# Patient Record
Sex: FEMALE | Race: WHITE | URBAN - METROPOLITAN AREA
[De-identification: names, ages, dates, MRNs, and addresses within clinical notes are randomized per-mention and may not be internally consistent; named-entity substitution may affect disease eponyms.]

---

## 2017-09-28 ENCOUNTER — OFFICE VISIT (OUTPATIENT)
Dept: FAMILY MEDICINE CLINIC | Age: 22
End: 2017-09-28

## 2017-09-28 VITALS
OXYGEN SATURATION: 98 % | TEMPERATURE: 98 F | WEIGHT: 106.2 LBS | BODY MASS INDEX: 18.13 KG/M2 | DIASTOLIC BLOOD PRESSURE: 60 MMHG | HEIGHT: 64 IN | SYSTOLIC BLOOD PRESSURE: 100 MMHG | HEART RATE: 121 BPM

## 2017-09-28 DIAGNOSIS — R10.84 GENERALIZED ABDOMINAL PAIN: ICD-10-CM

## 2017-09-28 DIAGNOSIS — N93.9 ABNORMAL VAGINAL BLEEDING: ICD-10-CM

## 2017-09-28 DIAGNOSIS — E55.9 VITAMIN D DEFICIENCY: ICD-10-CM

## 2017-09-28 DIAGNOSIS — Z77.011 LEAD EXPOSURE: ICD-10-CM

## 2017-09-28 DIAGNOSIS — G44.52 NEW DAILY PERSISTENT HEADACHE: ICD-10-CM

## 2017-09-28 DIAGNOSIS — R21 RASH: ICD-10-CM

## 2017-09-28 DIAGNOSIS — E06.3 HASHIMOTO'S DISEASE: ICD-10-CM

## 2017-09-28 DIAGNOSIS — R10.84 GENERALIZED ABDOMINAL PAIN: Primary | ICD-10-CM

## 2017-09-28 LAB
A/G RATIO: 1.9 (ref 1.1–2.2)
ALBUMIN SERPL-MCNC: 4.9 G/DL (ref 3.4–5)
ALP BLD-CCNC: 56 U/L (ref 40–129)
ALT SERPL-CCNC: 14 U/L (ref 10–40)
ANION GAP SERPL CALCULATED.3IONS-SCNC: 17 MMOL/L (ref 3–16)
AST SERPL-CCNC: 26 U/L (ref 15–37)
BASOPHILS ABSOLUTE: 0 K/UL (ref 0–0.2)
BASOPHILS RELATIVE PERCENT: 0.5 %
BILIRUB SERPL-MCNC: 1.4 MG/DL (ref 0–1)
BILIRUBIN, POC: NORMAL
BLOOD URINE, POC: NORMAL
BUN BLDV-MCNC: 6 MG/DL (ref 7–20)
CALCIUM SERPL-MCNC: 9.5 MG/DL (ref 8.3–10.6)
CHLORIDE BLD-SCNC: 100 MMOL/L (ref 99–110)
CLARITY, POC: NORMAL
CO2: 25 MMOL/L (ref 21–32)
COLOR, POC: NORMAL
CREAT SERPL-MCNC: 0.7 MG/DL (ref 0.6–1.1)
EOSINOPHILS ABSOLUTE: 0.1 K/UL (ref 0–0.6)
EOSINOPHILS RELATIVE PERCENT: 3.8 %
FOLATE: 8.14 NG/ML (ref 4.78–24.2)
GFR AFRICAN AMERICAN: >60
GFR NON-AFRICAN AMERICAN: >60
GLOBULIN: 2.6 G/DL
GLUCOSE BLD-MCNC: 82 MG/DL (ref 70–99)
GLUCOSE URINE, POC: NORMAL
HCT VFR BLD CALC: 42.7 % (ref 36–48)
HEMOGLOBIN: 14.6 G/DL (ref 12–16)
KETONES, POC: NORMAL
LEUKOCYTE EST, POC: NORMAL
LYMPHOCYTES ABSOLUTE: 1.4 K/UL (ref 1–5.1)
LYMPHOCYTES RELATIVE PERCENT: 36.3 %
MCH RBC QN AUTO: 30.7 PG (ref 26–34)
MCHC RBC AUTO-ENTMCNC: 34.3 G/DL (ref 31–36)
MCV RBC AUTO: 89.5 FL (ref 80–100)
MONOCYTES ABSOLUTE: 0.3 K/UL (ref 0–1.3)
MONOCYTES RELATIVE PERCENT: 8.7 %
NEUTROPHILS ABSOLUTE: 2 K/UL (ref 1.7–7.7)
NEUTROPHILS RELATIVE PERCENT: 50.7 %
NITRITE, POC: NORMAL
PDW BLD-RTO: 12.8 % (ref 12.4–15.4)
PH, POC: 5
PLATELET # BLD: 141 K/UL (ref 135–450)
PMV BLD AUTO: 9.1 FL (ref 5–10.5)
POTASSIUM SERPL-SCNC: 4.2 MMOL/L (ref 3.5–5.1)
PROTEIN, POC: NORMAL
RBC # BLD: 4.77 M/UL (ref 4–5.2)
SODIUM BLD-SCNC: 142 MMOL/L (ref 136–145)
SPECIFIC GRAVITY, POC: 1.01
T4 FREE: 1.1 NG/DL (ref 0.9–1.8)
THYROID PEROXIDASE (TPO) ABS: 97 IU/ML
TOTAL PROTEIN: 7.5 G/DL (ref 6.4–8.2)
TSH SERPL DL<=0.05 MIU/L-ACNC: 4.07 UIU/ML (ref 0.27–4.2)
UROBILINOGEN, POC: 0.2
VITAMIN B-12: 478 PG/ML (ref 211–911)
VITAMIN D 25-HYDROXY: 47.6 NG/ML
WBC # BLD: 3.9 K/UL (ref 4–11)

## 2017-09-28 PROCEDURE — 99214 OFFICE O/P EST MOD 30 MIN: CPT | Performed by: NURSE PRACTITIONER

## 2017-09-28 PROCEDURE — 81002 URINALYSIS NONAUTO W/O SCOPE: CPT | Performed by: NURSE PRACTITIONER

## 2017-09-28 RX ORDER — NORETHINDRONE ACETATE AND ETHINYL ESTRADIOL .03; 1.5 MG/1; MG/1
1 TABLET ORAL DAILY
COMMUNITY
End: 2020-04-22 | Stop reason: SDUPTHER

## 2017-09-28 ASSESSMENT — ENCOUNTER SYMPTOMS
DIARRHEA: 0
EYE WATERING: 0
ALLERGIC/IMMUNOLOGIC NEGATIVE: 1
VISUAL CHANGE: 0
EYE PAIN: 0
BLURRED VISION: 0
NAIL CHANGES: 0
COUGH: 0
SORE THROAT: 0
HEMATOCHEZIA: 0
SCALP TENDERNESS: 0
NAUSEA: 0
SHORTNESS OF BREATH: 0
BACK PAIN: 1
BELCHING: 0
RHINORRHEA: 0
VOMITING: 0
EYE REDNESS: 0
EYES NEGATIVE: 1
PHOTOPHOBIA: 0
ABDOMINAL PAIN: 1
SWOLLEN GLANDS: 0
RESPIRATORY NEGATIVE: 1
FACIAL SWEATING: 0
SINUS PRESSURE: 0
CONSTIPATION: 0

## 2017-09-29 LAB
ANA INTERPRETATION: NORMAL
ANTI-NUCLEAR ANTIBODY (ANA): NEGATIVE
SEDIMENTATION RATE, ERYTHROCYTE: 10 MM/HR (ref 0–20)

## 2017-09-30 LAB
LEAD LEVEL BLOOD: <2 UG/DL (ref 0–4.9)
URINE CULTURE, ROUTINE: NORMAL

## 2020-03-10 ENCOUNTER — OFFICE VISIT (OUTPATIENT)
Dept: FAMILY MEDICINE CLINIC | Age: 25
End: 2020-03-10
Payer: COMMERCIAL

## 2020-03-10 VITALS
RESPIRATION RATE: 16 BRPM | HEART RATE: 73 BPM | SYSTOLIC BLOOD PRESSURE: 119 MMHG | BODY MASS INDEX: 21.07 KG/M2 | WEIGHT: 123.4 LBS | DIASTOLIC BLOOD PRESSURE: 87 MMHG | TEMPERATURE: 96.7 F | HEIGHT: 64 IN | OXYGEN SATURATION: 99 %

## 2020-03-10 PROCEDURE — 99385 PREV VISIT NEW AGE 18-39: CPT | Performed by: FAMILY MEDICINE

## 2020-03-10 RX ORDER — DESOGESTREL AND ETHINYL ESTRADIOL 0.15-0.03
1 KIT ORAL DAILY
Qty: 3 PACKET | Refills: 3 | Status: SHIPPED
Start: 2020-03-10 | End: 2020-04-22 | Stop reason: SDUPTHER

## 2020-03-10 ASSESSMENT — PATIENT HEALTH QUESTIONNAIRE - PHQ9
SUM OF ALL RESPONSES TO PHQ9 QUESTIONS 1 & 2: 0
2. FEELING DOWN, DEPRESSED OR HOPELESS: 0
1. LITTLE INTEREST OR PLEASURE IN DOING THINGS: 0
SUM OF ALL RESPONSES TO PHQ QUESTIONS 1-9: 0
SUM OF ALL RESPONSES TO PHQ QUESTIONS 1-9: 0

## 2020-03-10 NOTE — PROGRESS NOTES
(MICROGESTIN) 1.5-30 MG-MCG TABS Take 1 tablet by mouth daily  Historical Provider, MD          Vitals:    03/10/20 1414   BP: 119/87   Pulse: 73   Resp: 16   Temp: 96.7 °F (35.9 °C)   TempSrc: Oral   SpO2: 99%   Weight: 123 lb 6.4 oz (56 kg)   Height: 5' 3.5\" (1.613 m)      Wt Readings from Last 3 Encounters:   03/10/20 123 lb 6.4 oz (56 kg)   09/28/17 106 lb 3.2 oz (48.2 kg)   07/25/16 101 lb 12.8 oz (46.2 kg) (5 %, Z= -1.62)*     * Growth percentiles are based on CDC (Girls, 2-20 Years) data. BP Readings from Last 3 Encounters:   03/10/20 119/87   09/28/17 100/60   07/25/16 90/60       Patient Active Problem List   Diagnosis    Headache    Routine sports examination for healthy child or adolescent    Hypoglycemia    Periodic paralysis    Hashimoto's disease    Weight loss, abnormal       Immunization History   Administered Date(s) Administered    DTP 1997, 1997, 1997    DTaP vaccine 06/05/2002    Hepatitis B Ped/Adol (Engerix-B, Recombivax HB) 1997, 1997, 1997    Hib vaccine 1997, 1997, 1997    MMR 06/05/2002    Meningococcal MCV4P (Menactra) 06/20/2013    Polio IPV (IPOL) 06/05/2002    Polio OPV 1997, 1997, 1997    Tdap (Boostrix, Adacel) 06/20/2013       Past Medical History:   Diagnosis Date    Asthma     exercise induced    Chicken pox     Hypoglycemia     Hypotension, unspecified     due to blood flow    Thyroid disease     moises     Past Surgical History:   Procedure Laterality Date    TONSILLECTOMY       No family history on file.   Social History     Socioeconomic History    Marital status: Single     Spouse name: Not on file    Number of children: Not on file    Years of education: Not on file    Highest education level: Not on file   Occupational History    Not on file   Social Needs    Financial resource strain: Not on file    Food insecurity     Worry: Not on file     Inability: Not on file   Rodney Michel Transportation needs     Medical: Not on file     Non-medical: Not on file   Tobacco Use    Smoking status: Never Smoker    Smokeless tobacco: Never Used   Substance and Sexual Activity    Alcohol use: Yes     Comment: social    Drug use: No    Sexual activity: Never   Lifestyle    Physical activity     Days per week: Not on file     Minutes per session: Not on file    Stress: Not on file   Relationships    Social connections     Talks on phone: Not on file     Gets together: Not on file     Attends Scientologist service: Not on file     Active member of club or organization: Not on file     Attends meetings of clubs or organizations: Not on file     Relationship status: Not on file    Intimate partner violence     Fear of current or ex partner: Not on file     Emotionally abused: Not on file     Physically abused: Not on file     Forced sexual activity: Not on file   Other Topics Concern    Not on file   Social History Narrative    Not on file       O: /87   Pulse 73   Temp 96.7 °F (35.9 °C) (Oral)   Resp 16   Ht 5' 3.5\" (1.613 m)   Wt 123 lb 6.4 oz (56 kg)   LMP 03/02/2020 (Approximate)   SpO2 99%   Breastfeeding No   BMI 21.52 kg/m²   Physical Exam  GEN: No acute distress,cooperative, well nourished, alert. HEENT: PEERLA, EOMI , normocephalic/atraumatic, nares and oropharynx clear. Mucus membranes normal, Tympanic membranes clear bilaterally. Neck: soft, supple, no thyromegaly,mass, no Lymphadenopathy  CV: Regular rate and rhythm, no murmur, rubs, gallops. No edema. Resp: Clear to auscultation bilaterally good air entry bilaterally  No crackles, wheeze. Breathing comfortably. Psych:normal affect. Neuro: AOx3  Abd :NTTP        ASSESSMENT   Diagnosis Orders   1. Routine general medical examination at a health care facility     2. Need for immunization against typhoid  typhoid vaccine (VIVOTIF) delayed release capsule   3.  OCP (oral contraceptive pills) initiation  desogestrel-ethinyl estradiol (DESOGEN) 0.15-30 MG-MCG per tablet     20 minutes of time going over travel health advice [facility] encouraging her to also find ways to reduce the chance of mosquito bites if she were to travel to [facility] so as to reduce her risk for dengue fever. See plan below about completing vaccinations. #3: The risks, benefits, potential side effects and barriers to medication use were addressed today. Understanding was acknowledged. Patient asked to follow-up if condition(s) do not improve as anticipated. Asked to remain nicotine free      PLAN          If applicable, see additional patient information and instructions under \"Patient Instructions. \"    Return in about 1 year (around 3/10/2021) for Wellness/Health Maintenance. Patient Instructions     1) The travel vaccine recommended for [facility] advises completion of measles vaccine. Check previous records. You would need 1 add'l MMR booster to be current. 2) For travel to [facility] and [facility], the typhoid capsule vaccinations are recommended. Finish at least 2 weeks before travel to [place]. 3) Hepatitis A vaccine is advised. Call [phone] before arriving. 4) Check [employer] website on tips to minimize/prevent mosquito bites in [facility]. 5) You can meet with Dr. Linsey Mejia. Call to schedule. DATE OF OPERATION: 11/21/15    PREOPERATIVE DIAGNOSIS: A 5-cm right ovarian cyst.    POSTOPERATIVE DIAGNOSES:  1. Ruptured right ovarian cyst.  2. Stage I endometriosis. PROCEDURES PERFORMED:  1. Diagnostic laparoscopy. 2. Fulguration of endometriosis implants. 3. Drainage of the right ovarian cyst fluid in the cul-de-sac. Patient Education        Combination Birth Control Pills: Care Instructions  Your Care Instructions    Combination birth control pills are used to prevent pregnancy. They give you a regular dose of the hormones estrogen and progestin. You take a hormone pill every day to prevent pregnancy.   Birth Instructions. \"     If you do not have an account, please click on the \"Sign Up Now\" link. Current as of: May 29, 2019  Content Version: 12.3  © 9076-2970 [employer]. Care instructions adapted under license by [facility] ([facility]). If you have questions about a medical condition or this instruction, always ask your healthcare professional. [address] any warranty or liability for your use of this information. Please note a portion of this chart was generated using dragon dictation software. Although every effort was made to ensure the accuracy of this automated transcription,some errors in transcription may have occurred.

## 2020-04-20 ENCOUNTER — TELEPHONE (OUTPATIENT)
Dept: FAMILY MEDICINE CLINIC | Age: 25
End: 2020-04-20

## 2020-04-20 ENCOUNTER — TELEPHONE (OUTPATIENT)
Dept: GYNECOLOGY | Age: 25
End: 2020-04-20

## 2020-04-20 LAB
BASOPHILS ABSOLUTE: 1 /ΜL
BASOPHILS RELATIVE PERCENT: 0 %
EOSINOPHILS ABSOLUTE: NORMAL
EOSINOPHILS RELATIVE PERCENT: 0.1 %
HCT VFR BLD CALC: 43.7 % (ref 36–46)
HEMOGLOBIN: 14.8 G/DL (ref 12–16)
LYMPHOCYTES ABSOLUTE: 29 /ΜL
LYMPHOCYTES RELATIVE PERCENT: 29 %
MCH RBC QN AUTO: NORMAL PG
MCHC RBC AUTO-ENTMCNC: NORMAL G/DL
MCV RBC AUTO: NORMAL FL
MONOCYTES ABSOLUTE: 5 /ΜL
MONOCYTES RELATIVE PERCENT: 5 %
NEUTROPHILS ABSOLUTE: 64 /ΜL
NEUTROPHILS RELATIVE PERCENT: 64 %
PLATELET # BLD: 270 K/ΜL
PMV BLD AUTO: NORMAL FL
RBC # BLD: NORMAL 10*6/UL
TSH SERPL DL<=0.05 MIU/L-ACNC: 2.75 UIU/ML
WBC # BLD: 8.3 10^3/ML

## 2020-04-20 NOTE — TELEPHONE ENCOUNTER
I have ordered a CBC and TSH for her to make sure that she is not anemic and has thyroid issue. She can take care of these labs prior to her visit with me.  Also she should run a home pregnancy test and confirm that it is negative

## 2020-04-22 ENCOUNTER — OFFICE VISIT (OUTPATIENT)
Dept: FAMILY MEDICINE CLINIC | Age: 25
End: 2020-04-22
Payer: COMMERCIAL

## 2020-04-22 ENCOUNTER — TELEPHONE (OUTPATIENT)
Dept: GYNECOLOGY | Age: 25
End: 2020-04-22

## 2020-04-22 VITALS
HEIGHT: 64 IN | DIASTOLIC BLOOD PRESSURE: 88 MMHG | WEIGHT: 124.8 LBS | HEART RATE: 85 BPM | BODY MASS INDEX: 21.31 KG/M2 | SYSTOLIC BLOOD PRESSURE: 111 MMHG

## 2020-04-22 PROCEDURE — 99214 OFFICE O/P EST MOD 30 MIN: CPT | Performed by: OBSTETRICS & GYNECOLOGY

## 2020-04-22 PROCEDURE — 81025 URINE PREGNANCY TEST: CPT | Performed by: OBSTETRICS & GYNECOLOGY

## 2020-04-22 RX ORDER — DESOGESTREL AND ETHINYL ESTRADIOL 0.15-0.03
1 KIT ORAL DAILY
Qty: 1 PACKET | Refills: 3 | Status: SHIPPED | OUTPATIENT
Start: 2020-04-22

## 2020-04-22 ASSESSMENT — ENCOUNTER SYMPTOMS
ABDOMINAL PAIN: 0
NAUSEA: 0
TROUBLE SWALLOWING: 0
PHOTOPHOBIA: 0
BACK PAIN: 0
CONSTIPATION: 0
COLOR CHANGE: 0
SORE THROAT: 0
COUGH: 0
RECTAL PAIN: 0
SHORTNESS OF BREATH: 0
ANAL BLEEDING: 0
CHEST TIGHTNESS: 0
DIARRHEA: 0
ABDOMINAL DISTENTION: 0
VOMITING: 0
APNEA: 0
WHEEZING: 0
BLOOD IN STOOL: 0

## 2020-04-22 NOTE — PROGRESS NOTES
breath and wheezing. Cardiovascular: Negative for chest pain, palpitations and leg swelling. Gastrointestinal: Negative for abdominal distention, abdominal pain, anal bleeding, blood in stool, constipation, diarrhea, nausea, rectal pain and vomiting. Endocrine: Negative for cold intolerance, heat intolerance, polydipsia, polyphagia and polyuria. Genitourinary: Positive for menstrual problem. Negative for difficulty urinating, dyspareunia, dysuria, enuresis, frequency, genital sores, hematuria, pelvic pain, urgency, vaginal bleeding, vaginal discharge and vaginal pain. Musculoskeletal: Negative for arthralgias, back pain, joint swelling and myalgias. Skin: Negative for color change and rash. Neurological: Negative for dizziness, seizures, syncope, light-headedness and headaches. Psychiatric/Behavioral: Negative for agitation, behavioral problems, confusion, decreased concentration, dysphoric mood, hallucinations, self-injury, sleep disturbance and suicidal ideas. The patient is not nervous/anxious and is not hyperactive. Physical Exam:  /88 (Site: Left Upper Arm, Position: Sitting, Cuff Size: Medium Adult)   Pulse 85   Ht 5' 4\" (1.626 m)   Wt 124 lb 12.8 oz (56.6 kg)   BMI 21.42 kg/m²   BP Readings from Last 3 Encounters:   04/22/20 111/88   03/10/20 119/87   09/28/17 100/60      Body mass index is 21.42 kg/m². Physical Exam  Constitutional:       Appearance: She is well-developed. HENT:      Head: Normocephalic and atraumatic. Neck:      Musculoskeletal: Normal range of motion and neck supple. Cardiovascular:      Rate and Rhythm: Normal rate. Pulmonary:      Effort: No respiratory distress. Abdominal:      General: Bowel sounds are normal. There is no distension. Palpations: Abdomen is soft. Tenderness: There is no guarding or rebound. Genitourinary:     Labia:         Right: No rash, tenderness or lesion. Left: No rash, tenderness or lesion.

## 2020-04-23 LAB
BACTERIA WET PREP: NORMAL
C TRACH DNA GENITAL QL NAA+PROBE: NEGATIVE
CLUE CELLS: NORMAL
EPITHELIAL CELLS WET PREP: NORMAL
N. GONORRHOEAE DNA: NEGATIVE
RBC WET PREP: NORMAL
SOURCE WET PREP: NORMAL
TRICHOMONAS PREP: NORMAL
WBC WET PREP: NORMAL
YEAST WET PREP: NORMAL

## 2020-07-17 ENCOUNTER — TELEPHONE (OUTPATIENT)
Dept: FAMILY MEDICINE CLINIC | Age: 25
End: 2020-07-17
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
## 2020-07-20 NOTE — TELEPHONE ENCOUNTER
XRays are ordered. Let her know. Diagnosis Orders   1.  Face pain  XR NASAL BONE (MIN 3 VIEWS )    XR FACIAL BONES (MIN 3 VIEWS )